# Patient Record
(demographics unavailable — no encounter records)

---

## 2018-07-02 NOTE — RAD
LEFT KNEE 4 VIEWS:

 

Date:  07/02/18 

 

HISTORY:  

Left knee pain. 

 

FINDINGS:

Degenerative changes are seen manifested by osteophyte formation and joint space narrowing, most prom
inent in medial tibiofemoral and patellofemoral compartments. No fracture, dislocation, or bony destr
uction is seen. 

 

IMPRESSION: 

Left knee osteoarthritis. 

 

 

POS: [facility]

## 2018-07-02 NOTE — RAD
RIGHT KNEE 4 VIEWS:

 

Date:  07/02/18 

 

HISTORY:  

Right knee pain. 

 

FINDINGS:

There are degenerative changes in right knee manifested by osteophyte formation and joint space narro
wing, most prominent in the medial tibiofemoral and patellofemoral compartments. No fracture, disloca
tion, or bony destruction is identified. 

 

IMPRESSION: 

Right knee osteoarthritis. 

 

 

POS: [facility]